# Patient Record
Sex: FEMALE | Race: OTHER | NOT HISPANIC OR LATINO | URBAN - METROPOLITAN AREA
[De-identification: names, ages, dates, MRNs, and addresses within clinical notes are randomized per-mention and may not be internally consistent; named-entity substitution may affect disease eponyms.]

---

## 2022-08-25 ENCOUNTER — EMERGENCY (EMERGENCY)
Facility: HOSPITAL | Age: 27
LOS: 1 days | Discharge: HOME | End: 2022-08-25
Attending: EMERGENCY MEDICINE | Admitting: EMERGENCY MEDICINE

## 2022-08-25 VITALS
DIASTOLIC BLOOD PRESSURE: 78 MMHG | SYSTOLIC BLOOD PRESSURE: 114 MMHG | RESPIRATION RATE: 18 BRPM | HEART RATE: 105 BPM | OXYGEN SATURATION: 98 % | TEMPERATURE: 98 F

## 2022-08-25 DIAGNOSIS — F10.929 ALCOHOL USE, UNSPECIFIED WITH INTOXICATION, UNSPECIFIED: ICD-10-CM

## 2022-08-25 PROCEDURE — 99284 EMERGENCY DEPT VISIT MOD MDM: CPT

## 2022-08-25 RX ORDER — ONDANSETRON 8 MG/1
4 TABLET, FILM COATED ORAL ONCE
Refills: 0 | Status: COMPLETED | OUTPATIENT
Start: 2022-08-25 | End: 2022-08-25

## 2022-08-25 RX ADMIN — ONDANSETRON 4 MILLIGRAM(S): 8 TABLET, FILM COATED ORAL at 05:01

## 2022-08-25 NOTE — ED PROVIDER NOTE - NSFOLLOWUPINSTRUCTIONS_ED_ALL_ED_FT
Abuse of Alcohol    WHAT YOU NEED TO KNOW:    Alcohol abuse means you drink more than the recommended daily or weekly limits. You may be drinking alcohol regularly or drinking large amounts in a short period of time (binge drinking). You continue to drink even when it causes legal, work, or relationship problems.    DISCHARGE INSTRUCTIONS:    Call your local emergency number (911 in the ), or have someone call if:   •You have sudden chest pain or trouble breathing.      •You want to harm yourself or others.      •You have a seizure.      Seek care immediately if:   •You cannot stop vomiting or you vomit blood.          Call your doctor if:   •You have hallucinations (you see or hear things that are not real).      •You have questions or concerns about your condition or care.      Medicines:   •Vitamin supplements may be given to treat low vitamin levels. Alcohol can make it hard for your body to absorb enough vitamins such as B1. Vitamin supplements may also be given to prevent alcohol-related brain damage.       •Take your medicine as directed. Contact your healthcare provider if you think your medicine is not helping or if you have side effects. Tell him or her if you are allergic to any medicine. Keep a list of the medicines, vitamins, and herbs you take. Include the amounts, and when and why you take them. Bring the list or the pill bottles to follow-up visits. Carry your medicine list with you in case of an emergency.      Health problems alcohol abuse can cause:   •Cancer in your liver, pancreas, stomach, colon, kidney, or breast      •Stroke or a heart attack      •Liver, kidney, or lung disease      •Blackouts, memory loss, brain damage, or dementia      •Diabetes, immune system problems, or thiamine (vitamin B1) deficiency      •Problems for you and your baby if you drink while pregnant      Recommended alcohol limits: One drink is defined as 12 oz of beer, 5 oz of wine, or 1.5 oz of liquor such as whiskey.  •Men 21 to 64 years should limit alcohol to 2 drinks a day. Do not have more than 4 drinks in 1 day or more than 14 in 1 week.      •All women, and men 65 or older should limit alcohol to 1 drink in a day. Do not have more than 3 drinks in 1 day or more than 7 in 1 week. Do not drink alcohol if you are pregnant.      Manage alcohol use:   •Work with healthcare providers on goals to drink less. This can help prevent health problems. For example, you may start by planning your weekly alcohol use. It will be easier to have fewer drinks if you plan ahead.      •Have food when you drink alcohol. Food will prevent alcohol from getting into your system too quickly. Eat before you have your first alcohol drink.      •Time your drinks carefully. Have no more than 1 drink in an hour. Have a liquid such as water, coffee, or a soft drink between alcohol drinks.      •Do not drive if you have had alcohol. Plan ahead so you have a safe ride home. Make sure someone who has not been drinking can help you get home safely. Plan to use a taxi or other ride service, if needed.      •Do not drink alcohol if you are taking medicine. Alcohol is dangerous when you combine it with certain medicines, such as acetaminophen or blood pressure medicine. Talk to your healthcare provider about all the medicines you currently take.      Follow up with your doctor as directed: Write down your questions so you remember to ask them during your visits.    For support and more information:   •Alcoholics Anonymous  Web Address: http://www.aa.org      •Substance Abuse and Mental Health Services Administration  [address]  [place] [address]  Web Address: http://www.samhsa.gov

## 2022-08-25 NOTE — ED PROVIDER NOTE - PROGRESS NOTE DETAILS
The patient is now awake and alert, clinically sober.  Able to walk a straight line.  Repeat exam and neuro/cranial nerve exams normal.  No evidence of head/neck trauma.  Patient denies any pain or other complaints.  Denies cp/sob/ha/abd pain.  Abd soft, lungs clear, heart exam normal.  Demarco po challenge.  Patient says only used alcohol no other substances.  Denies any assault.  Feels much better and pt feels safe for discharge.  No evidence of intoxication at this time or alcohol withdrawal.  No other complaints on discharge.

## 2022-08-25 NOTE — ED ADULT TRIAGE NOTE - CHIEF COMPLAINT QUOTE
Pt BIBA c/o AMS. Admits to ETOH use. As per EMS, pt was vomiting in car so friend called 911. Pt is awake, denies falls or head injury. Pt requested friend to hold personal items.

## 2022-08-25 NOTE — ED PROVIDER NOTE - OBJECTIVE STATEMENT
Pt BIB by EMS for ETOH intoxication.  Admits to heavy ETOH use today, +vomiting, no other complaints.  Placed in stretcher and quickly falls asleep.  Unable to cooperate with remainder of history due to clinical condition/AMS.

## 2022-08-25 NOTE — ED PROVIDER NOTE - CLINICAL SUMMARY MEDICAL DECISION MAKING FREE TEXT BOX
Alcohol intoxication, no signs of trauma, not hypoglycemic, neuro exam non-focal, will observe and reassess frequently.    Zofran given for nausea/vomiting.

## 2022-08-25 NOTE — ED PROVIDER NOTE - PATIENT PORTAL LINK FT
There are no Wet Read(s) to document. You can access the FollowMyHealth Patient Portal offered by [facility] by registering at the following website: [email or web address]. By joining "[employer]’s FollowMyHealth portal, you will also be able to view your health information using other applications (apps) compatible with our system.

## 2022-08-25 NOTE — ED ADULT NURSE NOTE - OBJECTIVE STATEMENT
female patient c/o vomiting s/p drinking tonight. patient accomp. with friend who called 911 for her. patient is alert verbal oriented x3 able to make needs known. provided with zofran IM. patient dry heaving; reports of vomiting prior to arrival to ED. pending reeval.

## 2022-08-25 NOTE — ED ADULT NURSE REASSESSMENT NOTE - NS ED NURSE REASSESS COMMENT FT1
Jamison Bullard (friend): [phone]
Patient reevaluated by MD; cleared for discharge. Patient alert verbal oriented x3; ambulatory w/ steady gait. Left ED safely without complaint. Discharge paperwork provided.
(3) no apparent problem